# Patient Record
Sex: MALE | URBAN - METROPOLITAN AREA
[De-identification: names, ages, dates, MRNs, and addresses within clinical notes are randomized per-mention and may not be internally consistent; named-entity substitution may affect disease eponyms.]

---

## 2019-03-06 ENCOUNTER — OFFICE VISIT (OUTPATIENT)
Dept: URBAN - METROPOLITAN AREA CLINIC 62 | Facility: CLINIC | Age: 44
End: 2019-03-06
Payer: COMMERCIAL

## 2019-03-06 DIAGNOSIS — H01.8 OTHER SPECIFIED INFLAMMATIONS OF EYELID: ICD-10-CM

## 2019-03-06 DIAGNOSIS — H00.12 CHALAZION RIGHT LOWER EYELID: Primary | ICD-10-CM

## 2019-03-06 DIAGNOSIS — E10.3293 TYPE 1 DIAB W MILD NONPRLF DIABETIC RTNOP W/O MACULAR EDEMA, BILATERAL: ICD-10-CM

## 2019-03-06 PROCEDURE — 11900 INJECT SKIN LESIONS </W 7: CPT | Performed by: OPHTHALMOLOGY

## 2019-03-06 PROCEDURE — 92004 COMPRE OPH EXAM NEW PT 1/>: CPT | Performed by: OPHTHALMOLOGY

## 2019-03-06 PROCEDURE — 67800 REMOVE EYELID LESION: CPT | Performed by: OPHTHALMOLOGY

## 2019-03-06 RX ORDER — BACITRACIN 500 [USP'U]/G
OINTMENT OPHTHALMIC
Qty: 1 | Refills: 3 | Status: ACTIVE
Start: 2019-03-06

## 2019-03-06 ASSESSMENT — KERATOMETRY
OS: 43.00
OD: 43.00

## 2019-03-06 ASSESSMENT — INTRAOCULAR PRESSURE
OS: 11
OD: 10

## 2019-03-06 NOTE — IMPRESSION/PLAN
Impression: Chalazion right lower eyelid: H00.12. Plan: Failed conservative treatment. Rec. observation vs. incision with drainage and Kenalog. Pt. elects surgery today. Continue warm compresses. R/B/A discussed  Risk recurrence discussed.

## 2019-03-06 NOTE — IMPRESSION/PLAN
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 Plan: Diabetes type I: mild background diabetic retinopathy, no signs of neovascularization noted. No treatment necessary at this time. Patient was instructed to monitor vision for sudden changes and to call if any changes noted. Discussed ocular and systemic benefits of blood sugar control.